# Patient Record
Sex: FEMALE | Race: WHITE | URBAN - METROPOLITAN AREA
[De-identification: names, ages, dates, MRNs, and addresses within clinical notes are randomized per-mention and may not be internally consistent; named-entity substitution may affect disease eponyms.]

---

## 2021-12-23 ENCOUNTER — OFFICE VISIT (OUTPATIENT)
Dept: NEUROLOGY | Facility: CLINIC | Age: 51
End: 2021-12-23
Payer: MEDICAID

## 2021-12-23 VITALS
DIASTOLIC BLOOD PRESSURE: 74 MMHG | BODY MASS INDEX: 27.99 KG/M2 | WEIGHT: 189 LBS | HEART RATE: 85 BPM | HEIGHT: 69 IN | SYSTOLIC BLOOD PRESSURE: 116 MMHG | RESPIRATION RATE: 16 BRPM

## 2021-12-23 DIAGNOSIS — R41.3 MEMORY LOSS: ICD-10-CM

## 2021-12-23 DIAGNOSIS — F07.81 POSTCONCUSSION SYNDROME: ICD-10-CM

## 2021-12-23 DIAGNOSIS — R42 VERTIGO: ICD-10-CM

## 2021-12-23 DIAGNOSIS — S06.0X0A CONCUSSION WITHOUT LOSS OF CONSCIOUSNESS, INITIAL ENCOUNTER: Primary | ICD-10-CM

## 2021-12-23 PROCEDURE — 3074F SYST BP LT 130 MM HG: CPT | Performed by: PHYSICIAN ASSISTANT

## 2021-12-23 PROCEDURE — 99203 OFFICE O/P NEW LOW 30 MIN: CPT | Performed by: PHYSICIAN ASSISTANT

## 2021-12-23 PROCEDURE — 3008F BODY MASS INDEX DOCD: CPT | Performed by: PHYSICIAN ASSISTANT

## 2021-12-23 PROCEDURE — 3078F DIAST BP <80 MM HG: CPT | Performed by: PHYSICIAN ASSISTANT

## 2021-12-23 RX ORDER — BUPROPION HYDROCHLORIDE 150 MG/1
150 TABLET ORAL DAILY
COMMUNITY
Start: 2021-11-08

## 2021-12-23 RX ORDER — AMANTADINE HYDROCHLORIDE 100 MG/1
100 CAPSULE, GELATIN COATED ORAL 2 TIMES DAILY
COMMUNITY
Start: 2021-10-02

## 2021-12-23 RX ORDER — MECLIZINE HCL 12.5 MG/1
12.5 TABLET ORAL 3 TIMES DAILY PRN
COMMUNITY
Start: 2021-07-09

## 2021-12-23 RX ORDER — CYCLOBENZAPRINE HCL 10 MG
1 TABLET ORAL AS NEEDED
COMMUNITY
Start: 2021-06-16

## 2021-12-23 RX ORDER — FUROSEMIDE 40 MG/1
40 TABLET ORAL AS NEEDED
COMMUNITY
Start: 2021-06-16

## 2021-12-23 RX ORDER — ESCITALOPRAM OXALATE 20 MG/1
20 TABLET ORAL DAILY
COMMUNITY
Start: 2021-10-02

## 2021-12-23 NOTE — PROGRESS NOTES
[facility] with [address]  5/22/1970  Primary Care Provider:  No primary care provider on file.     12/23/2021  Accompanied visit: No    45year old female patient being seen for: focusing. Has been taking the amantadine and feel likes it has helped with her focusing. Balance is off  Can of feel like she got off a boat  Felxeril is prn    (6/24/20)  TSH-1.46    MRI Brain(12/6/19)  IMPRESSION:   Impression:   1.  No acute intrapare A&Ox3  EOMI  CN II-XII intact  Strength 5/5 all extremities  DTRs 2+ and symmetric  FTN intact bilaterally. No drift on exam  Pass pointing on the right  Normal gait.  Difficulties with tandem gait  Slight swaying on romberg testing      Problem/s Identifie

## 2021-12-28 ENCOUNTER — TELEPHONE (OUTPATIENT)
Dept: NEUROLOGY | Facility: CLINIC | Age: 51
End: 2021-12-28

## 2021-12-28 NOTE — TELEPHONE ENCOUNTER
Received disc from [facility] MRI brain 12/6/2019/CT brain 10/27/2019/CT brain 10/15/2019. Unable to upload. Sent to radiology on 12/28/21 to have uploaded. Paper copies of reports placed in Dr. Lamar Santos. Sent copy to scanning on 12/28/21.   Patient has appt with Dr. Alfred Boles 1/24/2022

## 2022-01-03 NOTE — TELEPHONE ENCOUNTER
Disc uploaded with radiology and returned to [place] on 1/3/2022 for Dr. Kwadwo Monterroso to review.

## 2022-01-05 ENCOUNTER — TELEPHONE (OUTPATIENT)
Dept: NEUROLOGY | Facility: CLINIC | Age: 52
End: 2022-01-05

## 2022-01-05 NOTE — TELEPHONE ENCOUNTER
Received fax from [facility]; Folic acid 84.65 (5.06-46.23)  [id number]      Orders discontinued. Copied to scan  Placed on [employer].

## 2022-01-07 ENCOUNTER — TELEPHONE (OUTPATIENT)
Dept: NEUROLOGY | Facility: CLINIC | Age: 52
End: 2022-01-07

## 2022-02-01 ENCOUNTER — PROCEDURE VISIT (OUTPATIENT)
Dept: NEUROLOGY | Facility: CLINIC | Age: 52
End: 2022-02-01

## 2022-02-01 ENCOUNTER — NURSE ONLY (OUTPATIENT)
Dept: ELECTROPHYSIOLOGY | Facility: HOSPITAL | Age: 52
End: 2022-02-01
Attending: PHYSICIAN ASSISTANT
Payer: MEDICAID

## 2022-02-01 DIAGNOSIS — R41.3 MEMORY LOSS: ICD-10-CM

## 2022-02-01 DIAGNOSIS — S06.0X0A CONCUSSION WITHOUT LOSS OF CONSCIOUSNESS, INITIAL ENCOUNTER: ICD-10-CM

## 2022-02-01 PROCEDURE — 95819 EEG AWAKE AND ASLEEP: CPT | Performed by: OTHER

## 2022-02-01 NOTE — PROCEDURES
[facility] in [place]  in affiliation with [facility]  [address]  [place], [address] [place] [address]  [phone]  Fax [phone]    Name: Markie Liao  5/22/1970  Date of Study: 2/1/2022    ELECTROENCEPHALOGRAPHY     Ordering Physician: Luis M [facility]                               Primary Care Physician No primary care provider on file. Amantadine HCl 100 MG Oral Cap, Take 100 mg by mouth 2 (two) times daily. , Disp: , Rfl:   buPROPion 150 MG Oral Tablet 24 Hr, Take 150 mg by mouth daily. , Disp: , Rfl:   cyclobenzaprine 10 MG Oral Tab, Take 1 tablet by mouth as needed. , Disp: , Rfl:   escitalopram 20 MG Oral Tab, Take 20 mg by mouth daily. , Disp: , Rfl:   furosemide 40 MG Oral Tab, Take 40 mg by mouth as needed. , Disp: , Rfl:   meclizine 12.5 MG Oral Tab, Take 12.5 mg by mouth 3 (three) times daily as needed. , Disp: , Rfl:     No current facility-administered medications on file prior to visit. 45year oldfemale being tested because of:   Memory lapses    Using 10-20 International System; referential and bipolar montages were used for a routine EEG that was performed in the awake and sleep state without sedation. Digital recording was utilized. Background and sleep: The background activity demonstrated frequency of 9-10hz well organized medium voltage alpha waves with reactivity to eye opening and closing. There was a brief portion of stage II sleep seen, characterized by normal sleep spindles and occasional vertex waves and K complexes. Activation procedures:  Hyperventilation produced normal reactions of slowing. Photic driving produced driving. No activation of any paroxysmal discharges seen. Abnormal activity:  There were no focal abnormalities seen, and there were no epileptiform discharges. There were no abnormal paroxysmal discharges. Impression:  Normal awake and sleep EEG.  If there remains a concern for epilepsy, then sleep deprived or prolonged EEG may provide more information.            Stephanie Baker MD  Vascular & General Neurology  [facility]  Date prepared: 2/1/2022, 12:33 PM

## 2022-02-02 ENCOUNTER — TELEPHONE (OUTPATIENT)
Dept: NEUROLOGY | Facility: CLINIC | Age: 52
End: 2022-02-02

## 2022-02-02 NOTE — TELEPHONE ENCOUNTER
Called and left detailed message with the information from [facility]. Instructed to contact office to discuss EEG and ask about completion of testing.

## 2022-02-02 NOTE — TELEPHONE ENCOUNTER
Please call to let her know the EEG is normal. She should keep her scheduled follow-up visit in a few weeks. See if she has been able to do the neuropsychological testing or start the vestibular therapy.  Thanks

## 2022-02-02 NOTE — TELEPHONE ENCOUNTER
Patient returned nurses call. Informed patient that EEG was yanick. Patient has not started therapy yet. Patient has an appointment scheduled for Neuropsychological testing in May but is on the cancellation list.    Provider notified of above.

## 2022-02-21 ENCOUNTER — TELEPHONE (OUTPATIENT)
Dept: NEUROLOGY | Facility: CLINIC | Age: 52
End: 2022-02-21

## 2022-02-21 ENCOUNTER — OFFICE VISIT (OUTPATIENT)
Dept: NEUROLOGY | Facility: CLINIC | Age: 52
End: 2022-02-21
Payer: MEDICAID

## 2022-02-21 VITALS
SYSTOLIC BLOOD PRESSURE: 145 MMHG | HEART RATE: 89 BPM | DIASTOLIC BLOOD PRESSURE: 70 MMHG | RESPIRATION RATE: 16 BRPM | BODY MASS INDEX: 27.99 KG/M2 | HEIGHT: 69 IN | WEIGHT: 189 LBS

## 2022-02-21 DIAGNOSIS — G45.0 VERTEBROBASILAR ARTERY INSUFFICIENCY: ICD-10-CM

## 2022-02-21 DIAGNOSIS — S06.0X0A CONCUSSION WITHOUT LOSS OF CONSCIOUSNESS, INITIAL ENCOUNTER: Primary | ICD-10-CM

## 2022-02-21 DIAGNOSIS — R26.89 BALANCE PROBLEM: ICD-10-CM

## 2022-02-21 DIAGNOSIS — R29.90 EPISODE OF TRANSIENT NEUROLOGIC SYMPTOMS: ICD-10-CM

## 2022-02-21 DIAGNOSIS — M54.2 NECK PAIN: ICD-10-CM

## 2022-02-21 DIAGNOSIS — S16.1XXS CERVICAL STRAIN, ACUTE, SEQUELA: ICD-10-CM

## 2022-02-21 PROCEDURE — 3008F BODY MASS INDEX DOCD: CPT | Performed by: OTHER

## 2022-02-21 PROCEDURE — 3078F DIAST BP <80 MM HG: CPT | Performed by: OTHER

## 2022-02-21 PROCEDURE — 3077F SYST BP >= 140 MM HG: CPT | Performed by: OTHER

## 2022-02-21 PROCEDURE — 99214 OFFICE O/P EST MOD 30 MIN: CPT | Performed by: OTHER

## 2022-02-24 ENCOUNTER — TELEPHONE (OUTPATIENT)
Dept: NEUROLOGY | Facility: CLINIC | Age: 52
End: 2022-02-24
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
## 2022-03-01 ENCOUNTER — NURSE ONLY (OUTPATIENT)
Dept: ELECTROPHYSIOLOGY | Facility: HOSPITAL | Age: 52
End: 2022-03-01
Attending: Other
Payer: MEDICAID

## 2022-03-01 ENCOUNTER — PROCEDURE VISIT (OUTPATIENT)
Dept: NEUROLOGY | Facility: CLINIC | Age: 52
End: 2022-03-01

## 2022-03-01 DIAGNOSIS — R29.90 EPISODE OF TRANSIENT NEUROLOGIC SYMPTOMS: ICD-10-CM

## 2022-03-01 DIAGNOSIS — S06.0X0A CONCUSSION WITHOUT LOSS OF CONSCIOUSNESS, INITIAL ENCOUNTER: ICD-10-CM

## 2022-03-01 DIAGNOSIS — G45.0 VERTEBROBASILAR ARTERY INSUFFICIENCY: ICD-10-CM

## 2022-03-01 DIAGNOSIS — R26.89 BALANCE PROBLEM: ICD-10-CM

## 2022-03-01 DIAGNOSIS — S16.1XXS CERVICAL STRAIN, ACUTE, SEQUELA: ICD-10-CM

## 2022-03-01 PROCEDURE — 95721 EEG PHY/QHP>36<60 HR W/O VID: CPT | Performed by: OTHER

## 2022-03-01 PROCEDURE — 95708 EEG WO VID EA 12-26HR UNMNTR: CPT

## 2022-03-01 PROCEDURE — 95700 EEG CONT REC W/VID EEG TECH: CPT

## 2022-03-01 NOTE — TELEPHONE ENCOUNTER
RN called the patient and informed her that she can call central scheduling the MRA of the Carotids has been approved. Pt informed the RN she tried calling yesterday but was on hold for quite awhile. Pt is going to try to call again today. Pt verbalized understanding and did not have any further questions.

## 2022-03-02 ENCOUNTER — NURSE ONLY (OUTPATIENT)
Dept: ELECTROPHYSIOLOGY | Facility: HOSPITAL | Age: 52
End: 2022-03-02
Attending: Other
Payer: MEDICAID

## 2022-03-03 ENCOUNTER — NURSE ONLY (OUTPATIENT)
Dept: ELECTROPHYSIOLOGY | Facility: HOSPITAL | Age: 52
End: 2022-03-03
Attending: Other
Payer: MEDICAID

## 2022-03-09 ENCOUNTER — TELEPHONE (OUTPATIENT)
Dept: NEUROLOGY | Facility: CLINIC | Age: 52
End: 2022-03-09

## 2022-03-09 NOTE — TELEPHONE ENCOUNTER
Nickolas Plain  DOS:  01/5/22  Vitamin B12 lab results received  Reviewed by Dr. Flores Number    Copy to scan

## 2022-03-15 NOTE — PROGRESS NOTES
Miladys    46 hour EEG monitoring did not  any indication of any seizure events    Dr. Jett Zapata staff ---> please call the patient and follow up to make sure she got resutls

## 2022-03-16 ENCOUNTER — TELEPHONE (OUTPATIENT)
Dept: NEUROLOGY | Facility: CLINIC | Age: 52
End: 2022-03-16

## 2022-03-16 NOTE — TELEPHONE ENCOUNTER
Informed patient of message below. Verbalized understanding. Will get MRI soon. Is scheduled for f/up on 5/4/2022.

## 2022-03-22 ENCOUNTER — TELEPHONE (OUTPATIENT)
Dept: NEUROLOGY | Facility: CLINIC | Age: 52
End: 2022-03-22

## 2022-03-22 NOTE — TELEPHONE ENCOUNTER
Per impression:  There is narrowing of the Right V1 and V2 segments of the vertebral artery. More superiorly, the V2, V3, and before segments are symmetric and unremarkable. Recommend CTA of the neck for further characterization of this finding. Differential dx:  include stenosis from atherosclerosis, dissection or normal variant. Placed in provider bin for review.

## 2022-03-22 NOTE — TELEPHONE ENCOUNTER
Mukesh  DOS:  3/19/22  MRA Neck test results received    Placed copy in nurses bin for review  Copy to scanning

## 2022-04-21 ENCOUNTER — OFFICE VISIT (OUTPATIENT)
Dept: NEUROLOGY | Facility: CLINIC | Age: 52
End: 2022-04-21
Payer: MEDICAID

## 2022-04-21 VITALS
HEIGHT: 69 IN | RESPIRATION RATE: 16 BRPM | DIASTOLIC BLOOD PRESSURE: 72 MMHG | HEART RATE: 86 BPM | WEIGHT: 189 LBS | BODY MASS INDEX: 27.99 KG/M2 | SYSTOLIC BLOOD PRESSURE: 112 MMHG

## 2022-04-21 DIAGNOSIS — S06.0X0A CONCUSSION WITHOUT LOSS OF CONSCIOUSNESS, INITIAL ENCOUNTER: ICD-10-CM

## 2022-04-21 DIAGNOSIS — R29.90 EPISODE OF TRANSIENT NEUROLOGIC SYMPTOMS: Primary | ICD-10-CM

## 2022-04-21 PROCEDURE — 99214 OFFICE O/P EST MOD 30 MIN: CPT | Performed by: OTHER

## 2022-04-21 PROCEDURE — 3074F SYST BP LT 130 MM HG: CPT | Performed by: OTHER

## 2022-04-21 PROCEDURE — 3078F DIAST BP <80 MM HG: CPT | Performed by: OTHER

## 2022-04-21 PROCEDURE — 3008F BODY MASS INDEX DOCD: CPT | Performed by: OTHER

## 2022-04-21 RX ORDER — LEVETIRACETAM 500 MG/1
500 TABLET ORAL 2 TIMES DAILY
Qty: 60 TABLET | Refills: 5 | Status: SHIPPED | OUTPATIENT
Start: 2022-04-21

## 2022-05-12 ENCOUNTER — TELEPHONE (OUTPATIENT)
Dept: NEUROLOGY | Facility: CLINIC | Age: 52
End: 2022-05-12

## 2022-05-12 NOTE — TELEPHONE ENCOUNTER
Nichelle Amanda with [facility] calling - has question for Dr. Jared Peña. PT saw Dr. Luis Mobley for neuropsych appointment today. Patient reported during appointment that Dr. Jared Peña diagnosed patient with seizure disorder and started her on keppra to treat. They would like to confirm this with Dr. Jared Peña. Also, if seizure disorder, is patient allowed to drive? Please call either Nichelle Amanda (psychometrist) at [phone] to advise, or if preferred, direct number to Dr. Terral Jeans [phone]     Please advise.

## 2022-05-19 NOTE — TELEPHONE ENCOUNTER
Kana Fry from [facility] returned nurses call. Information discussed with provider relayed to her. Thankful for the information. Will be completing the neuropsychological testing with the information provided.

## 2022-05-19 NOTE — TELEPHONE ENCOUNTER
Left message for Kana Fry at [facility] regarding information requested. Patient has not been diagnosed at this time with seizure, no loss of consciousness. Diagnosis: transient neurological episodes    Patient can drive d/t no LOC. Requested a call back to discuss.

## 2022-05-19 NOTE — TELEPHONE ENCOUNTER
Jill Cintron from [facility] called again and needing questions answered for Dr. Yajaira Cantor so she can complete neuropsych evaluation.     Call Jill Cintron at [facility]

## 2022-05-26 ENCOUNTER — TELEPHONE (OUTPATIENT)
Dept: NEUROLOGY | Facility: CLINIC | Age: 52
End: 2022-05-26

## 2022-05-26 NOTE — TELEPHONE ENCOUNTER
Received neuropsychological consult notes from [facility] [place] from [place].     Placed copy in Dr. Lissette Reeves bin  Copy to scanning

## 2022-06-20 ENCOUNTER — TELEPHONE (OUTPATIENT)
Dept: NEUROLOGY | Facility: CLINIC | Age: 52
End: 2022-06-20

## 2022-06-20 NOTE — TELEPHONE ENCOUNTER
I really doubt that Keppra does that    But the way to practically approach this is to get off the offending medication and see what happens and after 2-4 weeks, rechallenge by taking the medication again    However if it is being used for seizures, then we have to replace it with another drug    Dr. Kanwal Hercules

## 2022-06-20 NOTE — TELEPHONE ENCOUNTER
RN called the patient and advised to go off the medication for 2-4 weeks to see if the muscle pain stops. If the muscle pain continues, she should continue back on the medication. RN called the patient with the above information. RN advised to call back with any questions.

## 2022-06-21 ENCOUNTER — TELEPHONE (OUTPATIENT)
Dept: NEUROLOGY | Facility: CLINIC | Age: 52
End: 2022-06-21

## 2023-02-13 NOTE — TELEPHONE ENCOUNTER
Patient calling, requesting us to fax order for neuropsychological testing to Dr. Octavia Ray office at [phone]. Fax sent, confirmation received. PT advised that she also had labs done recently that should be coming through from [facility]. Quality 137: Melanoma: Continuity Of Care - Recall System: Patient information entered into a recall system that includes: target date for the next exam specified AND a process to follow up with patients regarding missed or unscheduled appointments Detail Level: Detailed Detail Level: Generalized

## 2023-03-07 RX ORDER — LEVETIRACETAM 500 MG/1
TABLET ORAL
Qty: 60 TABLET | Refills: 1 | Status: SHIPPED | OUTPATIENT
Start: 2023-03-07

## 2023-04-18 ENCOUNTER — OFFICE VISIT (OUTPATIENT)
Dept: NEUROLOGY | Facility: CLINIC | Age: 53
End: 2023-04-18
Payer: MEDICAID

## 2023-04-18 VITALS
DIASTOLIC BLOOD PRESSURE: 67 MMHG | RESPIRATION RATE: 16 BRPM | BODY MASS INDEX: 27.99 KG/M2 | SYSTOLIC BLOOD PRESSURE: 110 MMHG | HEART RATE: 77 BPM | HEIGHT: 69 IN | WEIGHT: 189 LBS

## 2023-04-18 DIAGNOSIS — R29.90 EPISODE OF TRANSIENT NEUROLOGIC SYMPTOMS: Primary | ICD-10-CM

## 2023-04-18 DIAGNOSIS — R41.3 MEMORY LOSS: ICD-10-CM

## 2023-04-18 PROCEDURE — 99214 OFFICE O/P EST MOD 30 MIN: CPT | Performed by: OTHER

## 2023-04-18 PROCEDURE — 3008F BODY MASS INDEX DOCD: CPT | Performed by: OTHER

## 2023-04-18 PROCEDURE — 3074F SYST BP LT 130 MM HG: CPT | Performed by: OTHER

## 2023-04-18 PROCEDURE — 3078F DIAST BP <80 MM HG: CPT | Performed by: OTHER

## 2023-04-18 RX ORDER — LEVETIRACETAM 750 MG/1
750 TABLET ORAL 2 TIMES DAILY
Qty: 60 TABLET | Refills: 5 | Status: SHIPPED | OUTPATIENT
Start: 2023-04-18

## 2024-02-10 ENCOUNTER — PATIENT MESSAGE (OUTPATIENT)
Dept: NEUROLOGY | Facility: CLINIC | Age: 54
End: 2024-02-10

## 2024-02-12 ENCOUNTER — LAB ENCOUNTER (OUTPATIENT)
Dept: LAB | Age: 54
End: 2024-02-12
Attending: Other
Payer: COMMERCIAL

## 2024-02-12 DIAGNOSIS — R29.90 EPISODE OF TRANSIENT NEUROLOGIC SYMPTOMS: ICD-10-CM

## 2024-02-12 DIAGNOSIS — R41.3 MEMORY LOSS: ICD-10-CM

## 2024-02-12 PROCEDURE — 80177 DRUG SCRN QUAN LEVETIRACETAM: CPT

## 2024-02-12 PROCEDURE — 36415 COLL VENOUS BLD VENIPUNCTURE: CPT
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
## 2024-02-12 NOTE — TELEPHONE ENCOUNTER
From: Miladys Haley  To: Ed Hitchcock  Sent: 2/10/2024 9:28 PM CST  Subject: Seizure     Hello. I have an appointment scheduled for March. I had a bad seizure the other night as I pulled in the driveway. My son had to come out and help me inside. If there is a cancelation before my scheduled appointment can you please let me know. For I would like to come in sooner.    Also I lost my script for blood work. Can you please resend it to me so I can get my blood work done prior to my appointment     Thank you  Miladys Haley

## 2024-02-14 ENCOUNTER — OFFICE VISIT (OUTPATIENT)
Dept: NEUROLOGY | Facility: CLINIC | Age: 54
End: 2024-02-14
Payer: COMMERCIAL

## 2024-02-14 ENCOUNTER — TELEPHONE (OUTPATIENT)
Dept: NEUROLOGY | Facility: CLINIC | Age: 54
End: 2024-02-14

## 2024-02-14 VITALS
SYSTOLIC BLOOD PRESSURE: 110 MMHG | HEART RATE: 80 BPM | RESPIRATION RATE: 16 BRPM | DIASTOLIC BLOOD PRESSURE: 64 MMHG | BODY MASS INDEX: 27.99 KG/M2 | HEIGHT: 69 IN | WEIGHT: 189 LBS

## 2024-02-14 DIAGNOSIS — R29.90 EPISODE OF TRANSIENT NEUROLOGIC SYMPTOMS: Primary | ICD-10-CM

## 2024-02-14 PROCEDURE — 99214 OFFICE O/P EST MOD 30 MIN: CPT | Performed by: OTHER

## 2024-02-14 RX ORDER — LEVETIRACETAM 750 MG/1
750 TABLET ORAL 2 TIMES DAILY
Qty: 60 TABLET | Refills: 5 | Status: SHIPPED | OUTPATIENT
Start: 2024-02-14

## 2024-02-14 NOTE — TELEPHONE ENCOUNTER
[facility] requested medical records and billing from 10/15/19 to present.    Faxed to scan stat [phone] marked urgent for completion    Copy to scanning
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
## 2024-02-14 NOTE — PATIENT INSTRUCTIONS
Refill policies:    Allow 2-3 business days for refills; controlled substances may take longer.  Contact your pharmacy at least 5 days prior to running out of medication and have them send an electronic request or submit request through the “request refill” option in your [employer] account.  Refills are not addressed on weekends; covering physicians do not authorize routine medications on weekends.  No narcotics or controlled substances are refilled after noon on Fridays or by on call physicians.  By law, narcotics must be electronically prescribed.  A 30 day supply with no refills is the maximum allowed.  If your prescription is due for a refill, you may be due for a follow up appointment.  To best provide you care, patients receiving routine medications need to be seen at least once a year.  Patients receiving narcotic/controlled substance medications need to be seen at least once every 3 months.  In the event that your preferred pharmacy does not have the requested medication in stock (e.g. Backordered), it is your responsibility to find another pharmacy that has the requested medication available.  We will gladly send a new prescription to that pharmacy at your request.    Scheduling Tests:    If your physician has ordered radiology tests such as MRI or CT scans, please contact Central Scheduling at [phone] right away to schedule the test.  Once scheduled, the [facility] Centralized Referral Team will work with your insurance carrier to obtain pre-certification or prior authorization.  Depending on your insurance carrier, approval may take 3-10 days.  It is highly recommended patients assure they have received an authorization before having a test performed.  If test is done without insurance authorization, patient may be responsible for the entire amount billed.      Precertification and Prior Authorizations:  If your physician has recommended that you have a procedure or additional testing performed the [facility]  Centralized Referral Team will contact your insurance carrier to obtain pre-certification or prior authorization.    You are strongly encouraged to contact your insurance carrier to verify that your procedure/test has been approved and is a COVERED benefit.  Although the [facility] Centralized Referral Team does its due diligence, the insurance carrier gives the disclaimer that \"Although the procedure is authorized, this does not guarantee payment.\"    Ultimately the patient is responsible for payment.   Thank you for your understanding in this matter.  Paperwork Completion:  If you require FMLA or disability paperwork for your recovery, please make sure to either drop it off or have it faxed to our office at [phone]. Be sure the form has your name and date of birth on it.  The form will be faxed to our Forms Department and they will complete it for you.  There is a 25$ fee for all forms that need to be filled out.  Please be aware there is a 10-14 day turnaround time.  You will need to sign a release of information (PRESTON) form if your paperwork does not come with one.  You may call the Forms Department with any questions at [phone].  Their fax number is [phone].

## 2024-02-15 LAB — LEVETIRACETAM LVL: 22.6 UG/ML

## 2024-10-28 ENCOUNTER — OFFICE VISIT (OUTPATIENT)
Dept: NEUROLOGY | Facility: CLINIC | Age: 54
End: 2024-10-28
Payer: COMMERCIAL

## 2024-10-28 VITALS
WEIGHT: 189 LBS | HEIGHT: 69 IN | RESPIRATION RATE: 16 BRPM | SYSTOLIC BLOOD PRESSURE: 109 MMHG | DIASTOLIC BLOOD PRESSURE: 62 MMHG | HEART RATE: 77 BPM | BODY MASS INDEX: 27.99 KG/M2

## 2024-10-28 DIAGNOSIS — R41.3 MEMORY LOSS: ICD-10-CM

## 2024-10-28 DIAGNOSIS — R29.90 EPISODE OF TRANSIENT NEUROLOGIC SYMPTOMS: Primary | ICD-10-CM

## 2024-10-28 PROCEDURE — 99214 OFFICE O/P EST MOD 30 MIN: CPT | Performed by: OTHER

## 2024-10-28 RX ORDER — LEVETIRACETAM 750 MG/1
750 TABLET ORAL 2 TIMES DAILY
Qty: 180 TABLET | Refills: 3 | Status: SHIPPED | OUTPATIENT
Start: 2024-10-28

## 2024-10-28 RX ORDER — DIPHENHYDRAMINE HCL 25 MG
25 TABLET ORAL EVERY 6 HOURS PRN
COMMUNITY

## 2024-10-28 RX ORDER — ATORVASTATIN CALCIUM 20 MG/1
20 TABLET, FILM COATED ORAL DAILY
COMMUNITY
Start: 2024-08-21

## 2024-10-28 RX ORDER — ARIPIPRAZOLE 2 MG/1
2 TABLET ORAL DAILY
COMMUNITY
Start: 2024-08-19

## 2024-10-28 NOTE — PROGRESS NOTES
NEUROLOGY  [facility]       Miladys Haley  5/22/1970  Primary Care Provider:  JOSÉ MIGUEL WESTON    10/28/2024  54 year old yo,  was last seen on:: February    Seen for/plans last visit:  Post concussion symptoms    Previous visit and existing record notes reviewed in preparation for the face to face visit.  Relevant labs and studies reviewed and will be noted in relevant areas of this record.  Accompanied visit:     (x) No.      Present condition:  Despite the significant improvement with Keppra use, she has had 3 spells this summer.  1 was just simply spacing out without any consequence and lasting a few seconds.  The other 2 consisted of a spinning sensation lasting have a second but followed by some sort of post ictal sleepiness.  The other time was forewarned by a sense of balance problem and then next day she had the episode of spinning again.  Most of the spells sometimes are triggered by physical and sometimes stress like getting ready to travel.    No SE from Keppra      Past History update/new problem(s): as above    Review of Systems:  Review of Systems:  Denies systemic symptoms     No CP or SOB.  No GI or  symptoms. Relevant Neuro as noted above.      Medications:      Current Outpatient Medications:     ARIPiprazole 2 MG Oral Tab, Take 1 tablet (2 mg total) by mouth daily., Disp: , Rfl:     atorvastatin 20 MG Oral Tab, Take 1 tablet (20 mg total) by mouth daily., Disp: , Rfl:     diphenhydrAMINE HCl (BENADRYL ALLERGY) 25 MG Oral Tab, Take 1 tablet (25 mg total) by mouth every 6 (six) hours as needed., Disp: , Rfl:     levetiracetam (KEPPRA) 750 MG Oral Tab, Take 1 tablet (750 mg total) by mouth 2 (two) times daily., Disp: 180 tablet, Rfl: 3    buPROPion 150 MG Oral Tablet 24 Hr, Take 1 tablet (150 mg total) by mouth daily., Disp: , Rfl:     cyclobenzaprine 10 MG Oral Tab, Take 1 tablet (10 mg total) by mouth as needed., Disp: , Rfl:     escitalopram 20 MG Oral Tab, Take 1 tablet  (20 mg total) by mouth daily., Disp: , Rfl:     furosemide 40 MG Oral Tab, Take 1 tablet (40 mg total) by mouth as needed., Disp: , Rfl:     meclizine 12.5 MG Oral Tab, Take 1 tablet (12.5 mg total) by mouth 3 (three) times daily as needed., Disp: , Rfl:   PRN:     Allergies:  Allergies[1]       EXAM:  /62 (BP Location: Left arm, Patient Position: Sitting, Cuff Size: large)   Pulse 77   Resp 16   Ht 69\"   Wt 189 lb (85.7 kg)   LMP  (LMP Unknown)   BMI 27.91 kg/m²   Looks stated age  General Exam:  HENT:  pink conjunctiva anicteric sclerae  Neck no adenopathy, thyroid normal  Heart and Lungs:  normal  Extremities: no cyanosis, skin changes    NEURO  NEURO  Able to relate events with fluent speech and intact comprehension  CN 2-12: pupils reactive, VF full face symmetric sensation and movement tongue midline  No motor focal findings  Sensory: no lateralizing findings  Reflexes are symmetric  UMN signs: none  Gait: narrow based          INTERPRETATION of RELEVANT LABS and other DATA:          Problem/s Identified this visit:   1. Episode of transient neurologic symptoms    2. Memory loss          Discussion plus Diagnostics & Treatment Orders:  Studies ordered:  No orders of the defined types were placed in this encounter.      MEDS  Orders Placed This Encounter    ARIPiprazole 2 MG Oral Tab     Sig: Take 1 tablet (2 mg total) by mouth daily.    atorvastatin 20 MG Oral Tab     Sig: Take 1 tablet (20 mg total) by mouth daily.    diphenhydrAMINE HCl (BENADRYL ALLERGY) 25 MG Oral Tab     Sig: Take 1 tablet (25 mg total) by mouth every 6 (six) hours as needed.    levetiracetam (KEPPRA) 750 MG Oral Tab     Sig: Take 1 tablet (750 mg total) by mouth 2 (two) times daily.     Dispense:  180 tablet     Refill:  3       Discussed techniques of balance therapy    (x) Discussed potential side effects of any treatment relevant to above.  Includes explanation of tests as necessary.    Return in about 8 months (around  6/28/2025).      Patient understands that if needed, based on condition and or test results, follow up will be readjusted      Ed Hitchcock MD  Vascular & General Neurology  Director, Multiple Sclerosis Program  [facility]  10/28/2024, Time completed 9:39 AM    Decision making:  ( x ) labs reviewed/ordered - 1  (  ) new diagnosis: - 1  ( x) Images & studies independently reviewed -non F2F  (  ) Case/studies discussed with other caregivers - -non F2F  (  ) Telephone time with patiern or authorized Fam member--non F2F  ( x ) other records reviewed --non F2F including consultations  (  ) [facility] meetings - patient not present --non F2F  (  ) Independent Historian obtained    Non Face to Face CPT code 19143/59432 applies as documented above    PROCEDURE DONE     (   ) see notes        After visit, patient was escorted out and handed-off discharge process and instructions to the check out desk.  No additional issues relevant to visit were raised to staff at this time interval.        This document is to be interpreted as my current opinion regarding the case as of the stated date of service based on the information available to me at this time and may supersedes any prior opinion expressed either orally or in writing.  Services rendered are only within the scope of direct medical care  Sometimes, reports may have been prepared partially using a speech recognition software technology.  If a word or phrase is confusing or out of context, please do not hesitate to call for clarification.              [1]   Allergies  Allergen Reactions    Buspirone ANAPHYLAXIS     Throat swelling/closing     Sulfamethoxazole W/Trimethoprim UNKNOWN     swelling

## 2024-10-28 NOTE — PATIENT INSTRUCTIONS
Refill policies:    Allow 2-3 business days for refills; controlled substances may take longer.  Contact your pharmacy at least 5 days prior to running out of medication and have them send an electronic request or submit request through the “request refill” option in your [employer] account.  Refills are not addressed on weekends; covering physicians do not authorize routine medications on weekends.  No narcotics or controlled substances are refilled after noon on Fridays or by on call physicians.  By law, narcotics must be electronically prescribed.  A 30 day supply with no refills is the maximum allowed.  If your prescription is due for a refill, you may be due for a follow up appointment.  To best provide you care, patients receiving routine medications need to be seen at least once a year.  Patients receiving narcotic/controlled substance medications need to be seen at least once every 3 months.  In the event that your preferred pharmacy does not have the requested medication in stock (e.g. Backordered), it is your responsibility to find another pharmacy that has the requested medication available.  We will gladly send a new prescription to that pharmacy at your request.    Scheduling Tests:    If your physician has ordered radiology tests such as MRI or CT scans, please contact Central Scheduling at [phone] right away to schedule the test.  Once scheduled, the [facility] Centralized Referral Team will work with your insurance carrier to obtain pre-certification or prior authorization.  Depending on your insurance carrier, approval may take 3-10 days.  It is highly recommended patients assure they have received an authorization before having a test performed.  If test is done without insurance authorization, patient may be responsible for the entire amount billed.      Precertification and Prior Authorizations:  If your physician has recommended that you have a procedure or additional testing performed the [facility]  Centralized Referral Team will contact your insurance carrier to obtain pre-certification or prior authorization.    You are strongly encouraged to contact your insurance carrier to verify that your procedure/test has been approved and is a COVERED benefit.  Although the [facility] Centralized Referral Team does its due diligence, the insurance carrier gives the disclaimer that \"Although the procedure is authorized, this does not guarantee payment.\"    Ultimately the patient is responsible for payment.   Thank you for your understanding in this matter.  Paperwork Completion:  If you require FMLA or disability paperwork for your recovery, please make sure to either drop it off or have it faxed to our office at [phone]. Be sure the form has your name and date of birth on it.  The form will be faxed to our Forms Department and they will complete it for you.  There is a 25$ fee for all forms that need to be filled out.  Please be aware there is a 10-14 day turnaround time.  You will need to sign a release of information (PRESTON) form if your paperwork does not come with one.  You may call the Forms Department with any questions at [phone].  Their fax number is [phone].

## 2024-11-26 ENCOUNTER — TELEPHONE (OUTPATIENT)
Dept: NEUROLOGY | Facility: CLINIC | Age: 54
End: 2024-11-26

## 2024-11-26 NOTE — TELEPHONE ENCOUNTER
[employer] requested medical records.    Faxed to [employer] for completion.  Copy to scanning

## 2025-04-28 ENCOUNTER — OFFICE VISIT (OUTPATIENT)
Dept: NEUROLOGY | Facility: CLINIC | Age: 55
End: 2025-04-28
Payer: COMMERCIAL

## 2025-04-28 VITALS
HEART RATE: 71 BPM | SYSTOLIC BLOOD PRESSURE: 119 MMHG | RESPIRATION RATE: 16 BRPM | HEIGHT: 69 IN | WEIGHT: 206.38 LBS | DIASTOLIC BLOOD PRESSURE: 80 MMHG | BODY MASS INDEX: 30.57 KG/M2

## 2025-04-28 DIAGNOSIS — R41.3 MEMORY LOSS: ICD-10-CM

## 2025-04-28 DIAGNOSIS — S06.0X0S CONCUSSION WITHOUT LOSS OF CONSCIOUSNESS, SEQUELA: ICD-10-CM

## 2025-04-28 DIAGNOSIS — R29.90 EPISODE OF TRANSIENT NEUROLOGIC SYMPTOMS: Primary | ICD-10-CM

## 2025-04-28 DIAGNOSIS — R42 DIZZINESS: ICD-10-CM

## 2025-04-28 PROCEDURE — 99214 OFFICE O/P EST MOD 30 MIN: CPT | Performed by: OTHER

## 2025-04-28 RX ORDER — METFORMIN HYDROCHLORIDE 500 MG/1
500 TABLET, EXTENDED RELEASE ORAL
COMMUNITY
Start: 2025-02-25

## 2025-04-28 RX ORDER — BREXPIPRAZOLE 1 MG/1
1 TABLET ORAL DAILY
COMMUNITY
Start: 2025-04-03

## 2025-04-28 RX ORDER — LEVETIRACETAM 750 MG/1
750 TABLET ORAL 2 TIMES DAILY
Qty: 180 TABLET | Refills: 3 | Status: SHIPPED | OUTPATIENT
Start: 2025-04-28

## 2025-04-28 NOTE — PROGRESS NOTES
NEUROLOGY  [facility]       Miladys Haley  5/22/1970  Primary Care Provider:  JOSÉ MIGUEL WESTON    4/28/2025  54 year old yo,  was last seen on:: OCtober 2024    Seen for/plans last visit:  Post concussion spells    Previous visit and existing record notes reviewed in preparation for the face to face visit.  Relevant labs and studies reviewed and will be noted in relevant areas of this record.  Accompanied visit:     (x) No.      Present condition:  Doing a lot better, claims her memory test is getting better with follow up with Dr Pisano    Since her last visit in October she had only 1 spell,   She had a brief spell this March and heralded by a brief dizzy spell and then she looks like she is spacing out which is how others will know something is happening    Compliant with Keppra      Past History update/new problem(s): no new issues    Review of Systems:  Review of Systems:  Denies systemic symptoms     No CP or SOB.  No GI or  symptoms. Relevant Neuro as noted above.      Medications:    Medications - Current[1]  PRN: PRN Medications[2]    Allergies:  Allergies[3]       EXAM:  /80 (BP Location: Left arm, Patient Position: Sitting, Cuff Size: large)   Pulse 71   Resp 16   Ht 69\"   Wt 206 lb 6.4 oz (93.6 kg)   LMP  (LMP Unknown)   BMI 30.48 kg/m²   Looks stated age  General Exam:  HENT:  pink conjunctiva anicteric sclerae  Neck no adenopathy, thyroid normal  Heart and Lungs:  normal  Extremities: no cyanosis, skin changes    NEURO  Alert fluent speech and comprehension  CN: no nystagmus when looking to the left eye, it triggers intense subjective dizziness  Motor:       INTERPRETATION of RELEVANT LABS and other DATA:          Problem/s Identified this visit:   1. Episode of transient neurologic symptoms    2. Concussion without loss of consciousness, sequela    3. Memory loss    4. Dizziness          Discussion plus Diagnostics & Treatment Orders:  Continue Keppra   Vestibular  exercises given   Advised that the more she avoids physical movement that triggers subjective vertigo, the more the vestibular dysfunction continues          (x) Discussed potential side effects of any treatment relevant to above.  Includes explanation of tests as necessary.    Return in about 1 year (around 4/28/2026).      Patient understands that if needed, based on condition and or test results, follow up will be readjusted      Ed Hitchcock MD  Vascular & General Neurology  Director, Multiple Sclerosis Program  [facility]  4/28/2025, Time completed 9:13 AM    Decision making:  ( x ) labs reviewed/ordered - 1  (  ) new diagnosis: - 1  ( x) Images & studies independently reviewed -non F2F  (  ) Case/studies discussed with other caregivers - -non F2F  (  ) Telephone time with patiern or authorized Fam member--non F2F  ( x ) other records reviewed --non F2F including consultations  (  ) [facility] meetings - patient not present --non F2F  (  ) Independent Historian obtained    Non Face to Face CPT code 28110/59861 applies as documented above    PROCEDURE DONE     (   ) see notes        After visit, patient was escorted out and handed-off discharge process and instructions to the check out desk.  No additional issues relevant to visit were raised to staff at this time interval.        This document is to be interpreted as my current opinion regarding the case as of the stated date of service based on the information available to me at this time and may supersedes any prior opinion expressed either orally or in writing.  Services rendered are only within the scope of direct medical care  Sometimes, reports may have been prepared partially using a speech recognition software technology.  If a word or phrase is confusing or out of context, please do not hesitate to call for clarification.              [1]   Current Outpatient Medications:     metFORMIN  MG Oral Tablet 24 Hr, Take 1 tablet (500 mg  total) by mouth., Disp: , Rfl:     REXULTI 1 MG Oral Tab, Take 1 tablet by mouth daily., Disp: , Rfl:     levetiracetam (KEPPRA) 750 MG Oral Tab, Take 1 tablet (750 mg total) by mouth 2 (two) times daily., Disp: 180 tablet, Rfl: 3    ARIPiprazole 2 MG Oral Tab, Take 1 tablet (2 mg total) by mouth daily., Disp: , Rfl:     atorvastatin 20 MG Oral Tab, Take 1 tablet (20 mg total) by mouth daily., Disp: , Rfl:     diphenhydrAMINE HCl (BENADRYL ALLERGY) 25 MG Oral Tab, Take 1 tablet (25 mg total) by mouth every 6 (six) hours as needed., Disp: , Rfl:     buPROPion 150 MG Oral Tablet 24 Hr, Take 1 tablet (150 mg total) by mouth daily., Disp: , Rfl:     cyclobenzaprine 10 MG Oral Tab, Take 1 tablet (10 mg total) by mouth as needed., Disp: , Rfl:     escitalopram 20 MG Oral Tab, Take 1 tablet (20 mg total) by mouth daily., Disp: , Rfl:     furosemide 40 MG Oral Tab, Take 1 tablet (40 mg total) by mouth as needed., Disp: , Rfl:     meclizine 12.5 MG Oral Tab, Take 1 tablet (12.5 mg total) by mouth 3 (three) times daily as needed., Disp: , Rfl:   [2] [3]   Allergies  Allergen Reactions    Buspirone ANAPHYLAXIS     Throat swelling/closing     Sulfamethoxazole W/Trimethoprim OTHER (SEE COMMENTS)     Throat closes and swelling

## 2025-04-28 NOTE — PATIENT INSTRUCTIONS
[facility]   VESTIBULAR EXERCISES  Ed Hitchcock MD  GAIT-TRAINING EXERCISES  Begin walking with feet on a comfortable distance apart, progress to tandem position (1 foot in front of the other), eyes closed tandem, and head up tandem.    Walk across the room with the eyes open and then with the eyes closed.   Walk from heel to toe (tandem) across the room with the eyes open and then with the eyes closed.    (TANDEM WALKING is walking one foot in front of the other like \"walking a line\" similar to drunk test being given by police officers)      Visual-vestibular exercises  Review of small target (about 2\" x 2\") containing written material (example: Match cover or  a playing Card). Fix the target on the wall or other solid object-do not use a hand-held object.  While trying to keep the words on the target in clear focus, move your head.  First from side to side (approximately 45°) and then up and down (approximately 30°) but progressively higher speeds. The speed of the head movement should be increased until the words on the target can no longer be read.   Repeat 10 counts each task.  Perform the above exercises with a larger patterned target.   Hold a small target or a patterned piece of cardboard  (such as a playing Card) at arm’s length.   While trying to keep the pattern or target in focus, move the head and target horizontally in opposite directions approximately 20° to either side.  Play any games involving simultaneous movement of the head and use of vision    Exercises for BPPV  Assume an upright sitting position in bed, with her legs on the floor.  Close your eyes and suddenly tilt yourself to one side so that one side of your body is against the bed.  Turn the head slightly upward and wait for the vertigo to subside. Sit back up and wait for 30 seconds before tilting to the opposite side.  If vertigo occurs in this position as well, wait until it subsides and then sit up again.  Perform this exercise 5 times in the morning and 5 times at night until 2 days have passed during which you do not experience vertigo.  Repeat 8 counts to each side.

## 2025-05-13 ENCOUNTER — TELEPHONE (OUTPATIENT)
Dept: NEUROLOGY | Facility: CLINIC | Age: 55
End: 2025-05-13

## 2025-05-28 ENCOUNTER — TELEPHONE (OUTPATIENT)
Dept: NEUROLOGY | Facility: CLINIC | Age: 55
End: 2025-05-28

## 2025-05-28 NOTE — TELEPHONE ENCOUNTER
Received medical records request from [employer].  Faxed to [employer] for completion.  Fax confirmed

## (undated) DIAGNOSIS — R41.3 MEMORY LOSS: ICD-10-CM

## (undated) DIAGNOSIS — S06.0X0A CONCUSSION WITHOUT LOSS OF CONSCIOUSNESS, INITIAL ENCOUNTER: Primary | ICD-10-CM

## (undated) NOTE — MR AVS SNAPSHOT
After Visit Summary   3/1/2022    Zahira Lima   MRN: [id number]           Visit Information     Date & Time  3/1/2022  7:30 AM Provider  [address] [facility] EEG Dept. Phone  [phone]      Your Vitals Were     LMP    (LMP Unknown)             Allergies as of 3/1/2022  Review status set to Review Complete on 2/21/2022       Noted Reaction Type Reactions    Buspirone 06/24/2020    ANAPHYLAXIS    Throat swelling/closing     Sulfamethoxazole W/trimethoprim 08/04/2014    UNKNOWN    swelling      Your Current Medications        Dosage    Amantadine HCl 100 MG Oral Cap Take 100 mg by mouth 2 (two) times daily. buPROPion 150 MG Oral Tablet 24 Hr Take 150 mg by mouth daily. cyclobenzaprine 10 MG Oral Tab Take 1 tablet by mouth as needed. escitalopram 20 MG Oral Tab Take 20 mg by mouth daily. furosemide 40 MG Oral Tab Take 40 mg by mouth as needed. meclizine 12.5 MG Oral Tab Take 12.5 mg by mouth 3 (three) times daily as needed. Diagnoses for This Visit    Concussion without loss of consciousness, initial encounter   [815480]    Episode of transient neurologic symptoms   [833011]    Cervical strain, acute, sequela   [006298]    Vertebrobasilar artery insufficiency   [228418]    Balance problem   [412085]             We Ordered the Following     Normal Orders This Visit    EEG [NEU4 CUSTOM]                 Did you know that [facility] primary care physicians now offer Video Visits through [address] for adult patients for a variety of conditions such as allergies, back pain and cold symptoms? Skip the drive and waiting room and online chat with a doctor face-to-face using your web-cam enabled computer or mobile device wherever you are. Video Visits cost $50 and can be paid hassle-free using a credit, debit, or health savings card. Not active on [employer]? Ask us how to get signed up today!           If you receive a survey from [employer], please take a few minutes to complete it and provide feedback. We strive to deliver the best patient experience and are looking for ways to make improvements. Your feedback will help us do so. For more information on [employer], please visit [email or web address]           No text in SmartText           No text in SmartText